# Patient Record
Sex: FEMALE | Race: WHITE | NOT HISPANIC OR LATINO
[De-identification: names, ages, dates, MRNs, and addresses within clinical notes are randomized per-mention and may not be internally consistent; named-entity substitution may affect disease eponyms.]

---

## 2021-09-02 PROBLEM — Z00.00 ENCOUNTER FOR PREVENTIVE HEALTH EXAMINATION: Status: ACTIVE | Noted: 2021-09-02

## 2021-09-08 ENCOUNTER — APPOINTMENT (OUTPATIENT)
Dept: PHYSICAL MEDICINE AND REHAB | Facility: CLINIC | Age: 41
End: 2021-09-08
Payer: COMMERCIAL

## 2021-09-08 ENCOUNTER — NON-APPOINTMENT (OUTPATIENT)
Age: 41
End: 2021-09-08

## 2021-09-08 VITALS — BODY MASS INDEX: 24.17 KG/M2 | WEIGHT: 154 LBS | HEIGHT: 67 IN

## 2021-09-08 DIAGNOSIS — M53.3 SACROCOCCYGEAL DISORDERS, NOT ELSEWHERE CLASSIFIED: ICD-10-CM

## 2021-09-08 DIAGNOSIS — Z78.9 OTHER SPECIFIED HEALTH STATUS: ICD-10-CM

## 2021-09-08 PROCEDURE — 99203 OFFICE O/P NEW LOW 30 MIN: CPT

## 2021-09-08 RX ORDER — NORETHINDRONE ACETATE AND ETHINYL ESTRADIOL, ETHINYL ESTRADIOL AND FERROUS FUMARATE 1MG-10(24)
KIT ORAL
Refills: 0 | Status: ACTIVE | COMMUNITY

## 2021-09-08 NOTE — ASSESSMENT
[FreeTextEntry1] : Impression:\par 1. Coccygeal Pain\par \par Plan: The history and physical examination were reviewed. The diagnosis was discussed in detail with the patient. We discussed all the potential treatment options including physical therapy, medication, and activity modification; as well as alternative therapeutics such as acupuncture and massage. I am recommending that the patient undergo a course of physical therapy along with acupuncture. We emphasized the importance of the home exercise program. We also discussed topical Diclofenac as a medication option. The patient will return to the office for followup in 2-3 months. The patient expressed verbal understanding and is in agreement with the plan of care. All of the patient's questions and concerns were addressed during today's visit.\par

## 2021-09-08 NOTE — PHYSICAL EXAM
[FreeTextEntry1] : LUMBAR\par GEN: AAOx3. NAD.\par LS ROM: Flexion, extension, side-bending, rotation, oblique extension all full/pain free.  \par HIP ROM: Full and pain B/L.\par PALP: (+) TTP Coccyx. No TTP midline spinous processes, paravertebral muscles, SIJ, or GT B/L.\par INSP: Spine alignment is midline, with no evidence of scoliosis.\par STRENGTH: 5/5 bilateral hip flexors, knee extensors, ankle dorsiflexors, long toe extensors, ankle plantar flexors, hip abductors.\par SENS: Grossly intact to LT BLE.\par REFLEXES: Symmetric patella, achilles. \par TONE: Normal, No clonus.\par STANCE: No Trendelenburg with single leg stance.\par GAIT: Non antalgic, normal reciprocating heel to toe\par SPECIAL: SLR and Slump (-) bilaterally. FADIR/ISELA (-) B/L.

## 2021-09-08 NOTE — HISTORY OF PRESENT ILLNESS
[FreeTextEntry1] : Ms. ROSALEE HOPSON is a very pleasant 41 year female who comes in for evaluation of Lower back pain that has been ongoing for 3 years without any specific injury or inciting event. Patient does Pilates, and ice which do not provide noticeable relief. The pain is located primarily focal to her buttock/coccyx intermittent and described as achy and sharp. The pain is rated as 0/10 and ranges from 0-9/10. The patient's symptoms are aggravated by sitting on hard surfaces, especially after long walks and alleviated by nothing. The patient is Self- Employed which consists of sitting. The patient denies any night pain, numbness/tingling, weakness, or bowel/bladder dysfunction. The patient has no other complaints at this time. \par

## 2021-09-15 ENCOUNTER — RESULT REVIEW (OUTPATIENT)
Age: 41
End: 2021-09-15

## 2021-09-15 ENCOUNTER — OUTPATIENT (OUTPATIENT)
Dept: OUTPATIENT SERVICES | Facility: HOSPITAL | Age: 41
LOS: 1 days | End: 2021-09-15
Payer: COMMERCIAL

## 2021-09-15 ENCOUNTER — APPOINTMENT (OUTPATIENT)
Dept: ORTHOPEDIC SURGERY | Facility: CLINIC | Age: 41
End: 2021-09-15
Payer: COMMERCIAL

## 2021-09-15 VITALS — WEIGHT: 150 LBS | HEIGHT: 67 IN | RESPIRATION RATE: 16 BRPM | BODY MASS INDEX: 23.54 KG/M2

## 2021-09-15 DIAGNOSIS — Z78.9 OTHER SPECIFIED HEALTH STATUS: ICD-10-CM

## 2021-09-15 DIAGNOSIS — K08.499 PARTIAL LOSS OF TEETH DUE TO OTHER SPECIFIED CAUSE, UNSPECIFIED CLASS: Chronic | ICD-10-CM

## 2021-09-15 PROCEDURE — 73610 X-RAY EXAM OF ANKLE: CPT

## 2021-09-15 PROCEDURE — 99204 OFFICE O/P NEW MOD 45 MIN: CPT

## 2021-09-15 PROCEDURE — 73630 X-RAY EXAM OF FOOT: CPT | Mod: 26,LT,76

## 2021-09-15 PROCEDURE — 73610 X-RAY EXAM OF ANKLE: CPT | Mod: 26,RT

## 2021-09-15 PROCEDURE — 73630 X-RAY EXAM OF FOOT: CPT

## 2021-09-27 ENCOUNTER — APPOINTMENT (OUTPATIENT)
Dept: ORTHOPEDIC SURGERY | Facility: CLINIC | Age: 41
End: 2021-09-27
Payer: COMMERCIAL

## 2021-09-27 PROCEDURE — 99214 OFFICE O/P EST MOD 30 MIN: CPT | Mod: 25

## 2021-09-27 PROCEDURE — 20605 DRAIN/INJ JOINT/BURSA W/O US: CPT

## 2021-09-28 VITALS — WEIGHT: 150 LBS | RESPIRATION RATE: 16 BRPM | HEIGHT: 67 IN | BODY MASS INDEX: 23.54 KG/M2

## 2021-10-22 ENCOUNTER — APPOINTMENT (OUTPATIENT)
Dept: ORTHOPEDIC SURGERY | Facility: CLINIC | Age: 41
End: 2021-10-22

## 2021-11-17 ENCOUNTER — APPOINTMENT (OUTPATIENT)
Dept: ORTHOPEDIC SURGERY | Facility: CLINIC | Age: 41
End: 2021-11-17
Payer: COMMERCIAL

## 2021-11-17 DIAGNOSIS — M25.572 PAIN IN LEFT ANKLE AND JOINTS OF LEFT FOOT: ICD-10-CM

## 2021-11-17 DIAGNOSIS — M76.822 POSTERIOR TIBIAL TENDINITIS, LEFT LEG: ICD-10-CM

## 2021-11-17 DIAGNOSIS — M25.872 OTHER SPECIFIED JOINT DISORDERS, LEFT ANKLE AND FOOT: ICD-10-CM

## 2021-11-17 DIAGNOSIS — M21.42 FLAT FOOT [PES PLANUS] (ACQUIRED), LEFT FOOT: ICD-10-CM

## 2021-11-17 DIAGNOSIS — G89.29 PAIN IN LEFT FOOT: ICD-10-CM

## 2021-11-17 DIAGNOSIS — M24.20 DISORDER OF LIGAMENT, UNSPECIFIED SITE: ICD-10-CM

## 2021-11-17 DIAGNOSIS — M65.9 SYNOVITIS AND TENOSYNOVITIS, UNSPECIFIED: ICD-10-CM

## 2021-11-17 DIAGNOSIS — G89.29 PAIN IN LEFT ANKLE AND JOINTS OF LEFT FOOT: ICD-10-CM

## 2021-11-17 DIAGNOSIS — M79.672 PAIN IN LEFT FOOT: ICD-10-CM

## 2021-11-17 PROCEDURE — 77002 NEEDLE LOCALIZATION BY XRAY: CPT

## 2021-11-17 PROCEDURE — 99213 OFFICE O/P EST LOW 20 MIN: CPT | Mod: 25

## 2021-11-17 PROCEDURE — 20605 DRAIN/INJ JOINT/BURSA W/O US: CPT

## 2021-11-18 VITALS — RESPIRATION RATE: 16 BRPM | WEIGHT: 150 LBS | BODY MASS INDEX: 23.54 KG/M2 | HEIGHT: 67 IN

## 2021-12-14 ENCOUNTER — RX RENEWAL (OUTPATIENT)
Age: 41
End: 2021-12-14

## 2021-12-14 RX ORDER — MELOXICAM 15 MG/1
15 TABLET ORAL
Qty: 30 | Refills: 2 | Status: ACTIVE | COMMUNITY
Start: 2021-09-15 | End: 1900-01-01

## 2022-01-28 ENCOUNTER — APPOINTMENT (OUTPATIENT)
Dept: ORTHOPEDIC SURGERY | Facility: CLINIC | Age: 42
End: 2022-01-28

## 2022-01-28 ENCOUNTER — NON-APPOINTMENT (OUTPATIENT)
Age: 42
End: 2022-01-28